# Patient Record
(demographics unavailable — no encounter records)

---

## 2024-11-25 NOTE — PLAN
[FreeTextEntry1] : Routine GYN Exam -Discussed and reviewed importance of monthly BSE -Declines STI testing, importance safe sexual practices discussed -Pap/HPV test collected and sent at todays visit -RX mammo/sono given to pt with locations and instructions -Osteoporosis prevention; recc. vitd/Calcium supplementation and WBE to maintain bone density; rpt DEXA 1 yr -f/u PCP for recommended HCM, vaccinations and CA screening -Referred to GI for colonoscopy -Refilled Vatrex  rto 1 yr or sooner as needed.

## 2024-11-25 NOTE — HISTORY OF PRESENT ILLNESS
[Patient reported mammogram was normal] : Patient reported mammogram was normal [Patient reported PAP Smear was normal] : Patient reported PAP Smear was normal [Patient reported bone density results were abnormal] : Patient reported bone density results were abnormal [FreeTextEntry1] : 64 yo  presents for routine gyn exam. No complaints.  Demographics: Race:  Native Language: English : 3 Para: 1 0 2 1 LMP: 2015 OB History:  X 1 (GDM), ECTOPIC X 1, TOP X1 GYN: HSV ,    (suddenly, was fishing, ? MI) PMH: RA Surgical History: D&C, salpingostomy Allergies: NKDA Current Medications: Prescribed/Suppliments/OTC HYDROXYCHLOROQUINE 200 MG2 X DAY, DICLOFENAC, XELJANZ, Family Hx Diabetes: mother Heart Disease: father Fam HX comments: mother- multiple myeloma Cologard screening done years ago, no colonoscopy.    [Mammogramdate] : 2/2023 [PapSmeardate] : 10/2/23 [BoneDensityDate] : 4/1/24 [TextBox_37] : osteopenia [Patient refuses STI testing] : Patient refuses STI testing

## 2025-01-27 NOTE — ASSESSMENT
[FreeTextEntry1] : RA (CCP + and erosive), + ACL IgA (6-2019) + SSA Osteopenia elevated cholesterol Hyponatremia (chronic and stable) elevated vitamin d    RA Disease activity: clinically low disease activity Goal of treatment is RA disease low activity or remission. Current RA medications require blood work Q 3 months to assess for toxicity (bone marrow toxicity, liver toxicity, kidney toxicity) Will check laboratory tests to look for markers of disease activity and also to assess for medication toxicity. Current medications: hcq 200mg daily, diclofenac 1 pills/daily, olumiant continue olumiant and HCQ diclofeanc 25 mg most days  Xrays due 4-2026 Patient has needle phobia - will not use injectable medications Qgold intermediate in May 2024 and will repeat with next labs  Hep screening due 5-2025  Elevated vitamin D  - resolved   Osteopenia: DEXA 8-2021 with osteopenia. monitor vitamin d, DEXA 4-2024 with frax of 10 and 1.4 check dexa again in 4-2026  Calcium intake d/w patient today and [facility] handout on calcium given to patient in past  to use calcium 600 mg daily   ACL IgA positive [] no indication for intervention at this time  Elevated Alk Phos - resolved   Hyperlipidemia with mixed picture ASCVD risk score < 7.5% (4% on recent calculation)  has made dietary changes but no change in ldl  she wants to make more changes on her own prior to seeing nutritionist. Set goals of sat fat < 12 g daily and solubel fiber > 8g daily and check again in 12 weeks fasting.  If not better would rec lipid center eval because even though ascvd is < 7.5 she is on a luz maria-inhibitor.   SSA and dry mouth and dry eye [] see optho re punctal plugs  OA diclofenac prn  Today's medical care services serve as the continuing focal point for needed health care services that are part of ongoing care related to a patient's one or more serious conditions or a complex condition.   Time spent on the encounter included but is not limited to, preparing to see the patient, obtaining and/or reviewing separately obtained history, performing the evaluation, counseling and educating, independently interpreting results with communication to the patient, order placement, referring and/or communicating with other health professionals as described, and documenting clinical information in the electronic health record.   RON SHARPE was counseled on the differential, workup, disease course, and treatment/management.   Education was provided to RON SHARPE during this encounter. All questions and concerns were answered and addressed in detail.  RON SHARPE verbalized understanding and agreed to the plan.   RON SHARPE has been instructed to call for an earlier appointment if new symptoms develop in the interim. RON SHARPE has been instructed to make a follow-up appointment in 12 weeks

## 2025-01-27 NOTE — PHYSICAL EXAM
[General Appearance - Alert] : alert [General Appearance - In No Acute Distress] : in no acute distress [General Appearance - Well Nourished] : well nourished [General Appearance - Well Developed] : well developed [Sclera] : the sclera and conjunctiva were normal [Extraocular Movements] : extraocular movements were intact [Outer Ear] : the ears and nose were normal in appearance [Neck Appearance] : the appearance of the neck was normal [] : the neck was supple [No Focal Deficits] : no focal deficits [Oriented To Time, Place, And Person] : oriented to person, place, and time [Impaired Insight] : insight and judgment were intact [FreeTextEntry1] : multiple nevi

## 2025-01-27 NOTE — HISTORY OF PRESENT ILLNESS
[CCP] : Cyclic citrullinated peptide (CCP) antibody [SSA] : Anti-Ro/SSA [Ocular Symptoms] : ocular symptoms [Joint Swelling] : no joint swelling [Rash] : no rash [Shortness of Breath] : no shortness of breath [Joint Pain] : no joint pain [Dysphonia] : no dysphonia [Morning Stiffness] : no morning stiffness [Chest Pain] : no chest pain [Fatigue] : no fatigue [TextBox_2] : 2008 [TextBox_11] : AcL IgA [TextBox_38] : MTX (side effect) arava (side effects) AMARILYS (d/c ) [TextBox_40] :  mg daily  [TextBox_42] : Xeljanz (-> 10/2022) [TextBox_44] : Olumiant (10/2022 ->  current) [TextBox_70] : dry eyes ptga = 4 (fatigue and stiffness in the morning) thinking of retiring at 65 has made significant diet changes but no change in lipids  no current chest pain (had chest pain in 2019 with clear eval)   uses diclofenac daily

## 2025-01-27 NOTE — ASSESSMENT
[FreeTextEntry1] : JAMILA NSR.  healthy diet, exercise.  labs reviewed.  low chol. diet.  pt. will f/u rheumatology today. Her arthritis has improved.  recheck labs.  Cologuard form provided.

## 2025-01-27 NOTE — HISTORY OF PRESENT ILLNESS
[FreeTextEntry1] : see HPI [de-identified] : Here for CPE. She follows rheumatology. Eye exam by dr. Goldberg.

## 2025-01-27 NOTE — DATA REVIEWED
[FreeTextEntry1] : Laboratory and radiology studies that were personally reviewed at today's visit are summarized below:    8-30-21:  DEXA:  Osteopenia - 8.2 and 0.7. 8-30-21:   X-ray:  hand/wrist/foot/ankle:  swelling at the left 5th MTP joint,  10-9-20:   WBC = 3.23 with anc of 1.35, Acl IgA = 17.9  + CCP , + SSA, + Acl IgA (mild and 6-14-19)  MRI left wrist 6-3-19 1.  In the region demarcated by skin markers, there is tenosynovitis of  the flexor carpi radialis tendon sheath. 2.  Extensive synovitis with chondral loss and subchondral marrow edema  in the joints of the wrist, consistent with active inflammatory changes  secondary to rheumatoid arthritis.  DEXA 3-11-19:  WNL 6/2019L  X-ray:  hand/wrist/foot/ankle:  erosive changes noted.   6/2019:  cervical spine xray - dynamic instability at C3-C6 but normal C1/c2   CXR 7-7-2015:  WNL

## 2025-01-27 NOTE — HEALTH RISK ASSESSMENT
[Good] : ~his/her~  mood as  good [No] : In the past 12 months have you used drugs other than those required for medical reasons? No [No falls in past year] : Patient reported no falls in the past year [0] : 2) Feeling down, depressed, or hopeless: Not at all (0) [PHQ-2 Negative - No further assessment needed] : PHQ-2 Negative - No further assessment needed [Never] : Never [NO] : No [No Retinopathy] : No retinopathy [Patient declined colonoscopy] : Patient declined colonoscopy [HIV test declined] : HIV test declined [Hepatitis C test declined] : Hepatitis C test declined [None] : None [Alone] : lives alone [Employed] : employed [College] : College [] :  [# Of Children ___] : has [unfilled] children [Feels Safe at Home] : Feels safe at home [Fully functional (bathing, dressing, toileting, transferring, walking, feeding)] : Fully functional (bathing, dressing, toileting, transferring, walking, feeding) [Fully functional (using the telephone, shopping, preparing meals, housekeeping, doing laundry, using] : Fully functional and needs no help or supervision to perform IADLs (using the telephone, shopping, preparing meals, housekeeping, doing laundry, using transportation, managing medications and managing finances) [Reports changes in hearing] : Reports changes in hearing [Reports normal functional visual acuity (ie: able to read med bottle)] : Reports normal functional visual acuity [Patient reported PAP Smear was normal] : Patient reported PAP Smear was normal [de-identified] : physically active - yoga and walking  [de-identified] : regular [[id number]] : 0 [EyeExamDate] : 12/24 [Change in mental status noted] : No change in mental status noted [Language] : denies difficulty with language [Handling Complex Tasks] : denies difficulty handling complex tasks [Sexually Active] : not sexually active [Reports changes in vision] : Reports no changes in vision [Reports changes in dental health] : Reports no changes in dental health [Smoke Detector] : no smoke detector [Carbon Monoxide Detector] : no carbon monoxide detector [Seat Belt] : does not use seat belt [Sunscreen] : does not use sunscreen [PapSmearDate] : 12/24 [ColonoscopyComments] : cologuard negative in past  [FreeTextEntry2] :  [de-identified] : hearing loss [AdvancecareDate] : 01/25

## 2025-02-26 NOTE — PHYSICAL EXAM
[Normal] : tympanic membranes are normal in both ears [de-identified] : right cerumen, left clear

## 2025-02-26 NOTE — PROCEDURE
[FreeTextEntry3] : cerumen removed from right ear canal with suction. after cleaning canal noted to be without inflammation or trauma. TM intact without effusion.

## 2025-02-26 NOTE — ASSESSMENT
[FreeTextEntry1] : asymmetric SNHL: - left HF asymmetry noted - discussed MRI to r/o retrocochlear etiology however she declines for now and prefers to observe - repeat audio in 3 months

## 2025-02-26 NOTE — HISTORY OF PRESENT ILLNESS
[de-identified] : 64yo female here for hearing check. Feels she is not hearing as well as she used to. Mostly on the left and also some intermittent ringing in the left ear although does not have any ringing currently. No vertigo. No hx ear infections/surgery.

## 2025-02-26 NOTE — END OF VISIT
[FreeTextEntry3] : I personally saw and examined Ms. RON SHARPE in detail this visit today. I personally reviewed the HPI, PMH, FH, SH, ROS and medications/allergies. I have spoken to MINDY Rodgers regarding the history and have personally determined the assessment and plan of care, and documented this myself. I was present and participated in all key portions of the encounter and all procedures noted above. I have made changes in the body of the note where appropriate.   Attesting Faculty: See Attending Signature Below

## 2025-05-20 NOTE — HEALTH RISK ASSESSMENT
[No] : In the past 12 months have you used drugs other than those required for medical reasons? No [No falls in past year] : Patient reported no falls in the past year [0] : 2) Feeling down, depressed, or hopeless: Not at all (0) [PHQ-2 Negative - No further assessment needed] : PHQ-2 Negative - No further assessment needed [Never] : Never [de-identified] : regular [[id number]] : 0

## 2025-05-20 NOTE — REVIEW OF SYSTEMS
[Chills] : chills [Fatigue] : fatigue [Hoarseness] : hoarseness [Sore Throat] : sore throat [Cough] : cough [Negative] : Heme/Lymph [Earache] : no earache [Hearing Loss] : no hearing loss [Nosebleed] : no nosebleeds [Nasal Discharge] : no nasal discharge [Postnasal Drip] : no postnasal drip [Chest Pain] : no chest pain [Palpitations] : no palpitations [Leg Claudication] : no leg claudication [Lower Ext Edema] : no lower extremity edema [Orthopnea] : no orthopnea [Paroxysmal Nocturnal Dyspnea] : no paroxysmal nocturnal dyspnea [Shortness Of Breath] : no shortness of breath [Wheezing] : no wheezing

## 2025-05-20 NOTE — HISTORY OF PRESENT ILLNESS
[Moderate] : moderate [___ Days ago] : [unfilled] days ago [Sudden] : suddenly [Constant] : constant [Congestion] : congestion [Cough] : cough [Sore Throat] : sore throat [Chills] : chills [Fatigue] : fatigue [Headache] : headache [In Morning] : in the morning [Worsening] : worsening [Wheezing] : no wheezing [Anorexia] : no anorexia [Shortness Of Breath] : no shortness of breath [Earache] : no earache [Fever] : no fever [FreeTextEntry2] : low grade temp. [FreeTextEntry5] : none [FreeTextEntry8] : No sick contacts. She is AAOX3,NAD.

## 2025-05-20 NOTE — ASSESSMENT
[FreeTextEntry1] : salt water gargles. supportive care, fluids.   rest. tylenol prn. if no improvement, return to office. pt. will hold Omulient for the week of antibiotic therapy.

## 2025-05-20 NOTE — PHYSICAL EXAM
[No Acute Distress] : no acute distress [Well Nourished] : well nourished [Well Developed] : well developed [Ill-Appearing] : ill-appearing [Normal Sclera/Conjunctiva] : normal sclera/conjunctiva [PERRL] : pupils equal round and reactive to light [EOMI] : extraocular movements intact [Normal Outer Ear/Nose] : the outer ears and nose were normal in appearance [No JVD] : no jugular venous distention [No Lymphadenopathy] : no lymphadenopathy [Supple] : supple [Thyroid Normal, No Nodules] : the thyroid was normal and there were no nodules present [No Respiratory Distress] : no respiratory distress  [No Accessory Muscle Use] : no accessory muscle use [Clear to Auscultation] : lungs were clear to auscultation bilaterally [Normal Rate] : normal rate  [Regular Rhythm] : with a regular rhythm [Normal S1, S2] : normal S1 and S2 [No Murmur] : no murmur heard [No Carotid Bruits] : no carotid bruits [No Abdominal Bruit] : a ~M bruit was not heard ~T in the abdomen [No Varicosities] : no varicosities [Pedal Pulses Present] : the pedal pulses are present [No Edema] : there was no peripheral edema [No Palpable Aorta] : no palpable aorta [No Extremity Clubbing/Cyanosis] : no extremity clubbing/cyanosis [Soft] : abdomen soft [Non Tender] : non-tender [Non-distended] : non-distended [No Masses] : no abdominal mass palpated [No HSM] : no HSM [Normal Bowel Sounds] : normal bowel sounds [Normal Posterior Cervical Nodes] : no posterior cervical lymphadenopathy [Normal Anterior Cervical Nodes] : no anterior cervical lymphadenopathy [No CVA Tenderness] : no CVA  tenderness [No Spinal Tenderness] : no spinal tenderness [No Joint Swelling] : no joint swelling [Grossly Normal Strength/Tone] : grossly normal strength/tone [No Rash] : no rash [Coordination Grossly Intact] : coordination grossly intact [No Focal Deficits] : no focal deficits [Normal Gait] : normal gait [Deep Tendon Reflexes (DTR)] : deep tendon reflexes were 2+ and symmetric [Normal Affect] : the affect was normal [Normal Insight/Judgement] : insight and judgment were intact [de-identified] : throat erythema

## 2025-06-09 NOTE — ASSESSMENT
[FreeTextEntry1] : RA (CCP + and erosive), + ACL IgA (6-2019) + SSA Osteopenia elevated cholesterol Hyponatremia (chronic and stable) elevated vitamin d    RA Disease activity: clinically low disease activity Goal of treatment is RA disease low activity or remission. Current RA medications require blood work Q 3 months to assess for toxicity (bone marrow toxicity, liver toxicity, kidney toxicity) Will check laboratory tests to look for markers of disease activity and also to assess for medication toxicity. Current medications: hcq 200mg daily, diclofenac 1 pills/daily, olumiant continue olumiant and HCQ diclofeanc 25 mg most days  Xrays due 4-2026 Patient has needle phobia - will not use injectable medications Qgold due 9-2025 Hep screening due next blood work   Elevated vitamin D  - resolved   Elevated inflammatory markers   -> repeat in 1 week as likely due to recent RSV   MIld hyponatremia   -> repeat in 1 week.   Osteopenia: DEXA 8-2021 with osteopenia. monitor vitamin d, DEXA 4-2024 with frax of 10 and 1.4 check dexa again in 4-2026  Calcium intake d/w patient today and [facility] handout on calcium given to patient in past  to use calcium 600 mg daily   ACL IgA positive [] no indication for intervention at this time  Elevated Alk Phos - resolved   Hyperlipidemia with mixed picture ASCVD risk score < 7.5% (4% on recent calculation)  has made dietary changes  some improvement.  will check again in 3-6 months.  If not better would rec lipid center eval because even though ascvd is < 7.5 she is on a luz maria-inhibitor.   SSA and dry mouth and dry eye [] see optho re punctal plugs  OA diclofenac prn  Today's medical care services serve as the continuing focal point for needed health care services that are part of ongoing care related to a patient's one or more serious conditions or a complex condition.   Time spent on the encounter included but is not limited to, preparing to see the patient, obtaining and/or reviewing separately obtained history, performing the evaluation, counseling and educating, independently interpreting results with communication to the patient, order placement, referring and/or communicating with other health professionals as described, and documenting clinical information in the electronic health record.   RON SHARPE was counseled on the differential, workup, disease course, and treatment/management.   Education was provided to RON SHARPE during this encounter. All questions and concerns were answered and addressed in detail.  RON SHARPE verbalized understanding and agreed to the plan.   RON SHARPE has been instructed to call for an earlier appointment if new symptoms develop in the interim. RON SHARPE has been instructed to make a follow-up appointment in 12 weeks

## 2025-06-09 NOTE — HISTORY OF PRESENT ILLNESS
[CCP] : Cyclic citrullinated peptide (CCP) antibody [SSA] : Anti-Ro/SSA [Ocular Symptoms] : ocular symptoms [Joint Swelling] : no joint swelling [Rash] : no rash [Shortness of Breath] : no shortness of breath [Joint Pain] : no joint pain [Dysphonia] : no dysphonia [Morning Stiffness] : no morning stiffness [Fatigue] : no fatigue [Chest Pain] : no chest pain [TextBox_2] : 2008 [TextBox_11] : AcL IgA [TextBox_38] : MTX (side effect) arava (side effects) AMARILYS (d/c ) [TextBox_40] :  mg daily  [TextBox_42] : Xeljanz (-> 10/2022) [TextBox_44] : Olumiant (10/2022 ->  current) [TextBox_70] : recent RSV infection - resolved.   has made dietary changes  dry eyes new dry mouth after RSV and has improved  ptga = 4 (fatigue and stiffness in the morning)  no current chest pain (had chest pain in 2019 with clear eval)  uses diclofenac daily but using lower dose and tolerating